# Patient Record
Sex: MALE | Race: WHITE | NOT HISPANIC OR LATINO | Employment: FULL TIME | URBAN - METROPOLITAN AREA
[De-identification: names, ages, dates, MRNs, and addresses within clinical notes are randomized per-mention and may not be internally consistent; named-entity substitution may affect disease eponyms.]

---

## 2024-11-13 ENCOUNTER — OFFICE VISIT (OUTPATIENT)
Dept: OBGYN CLINIC | Facility: CLINIC | Age: 49
End: 2024-11-13
Payer: COMMERCIAL

## 2024-11-13 VITALS
HEIGHT: 70 IN | BODY MASS INDEX: 30.06 KG/M2 | SYSTOLIC BLOOD PRESSURE: 130 MMHG | WEIGHT: 210 LBS | DIASTOLIC BLOOD PRESSURE: 70 MMHG

## 2024-11-13 DIAGNOSIS — M65.332 TRIGGER FINGER, LEFT MIDDLE FINGER: Primary | ICD-10-CM

## 2024-11-13 DIAGNOSIS — M65.322 TRIGGER FINGER, LEFT INDEX FINGER: ICD-10-CM

## 2024-11-13 PROCEDURE — 99203 OFFICE O/P NEW LOW 30 MIN: CPT | Performed by: SURGERY

## 2024-11-13 RX ORDER — DIPHENOXYLATE HYDROCHLORIDE AND ATROPINE SULFATE 2.5; .025 MG/1; MG/1
1 TABLET ORAL DAILY
COMMUNITY

## 2024-11-13 RX ORDER — METOPROLOL SUCCINATE 100 MG/1
100 TABLET, EXTENDED RELEASE ORAL DAILY
COMMUNITY
Start: 2024-11-04

## 2024-11-13 RX ORDER — METHIMAZOLE 5 MG/1
7.5 TABLET ORAL
COMMUNITY
Start: 2024-07-09 | End: 2025-07-09

## 2024-11-13 NOTE — PROGRESS NOTES
ORTHOPAEDIC HAND, WRIST, AND ELBOW OFFICE  VISIT       ASSESSMENT/PLAN:      49 y.o. year old male who presents with left Index and Middle finger trigger fingers    Physical exam was performed and we discussed the findings. Has some mild signs of trigger fingers  Can perform steroid injections today or monitor his symptoms. Patient will monitor. Can return for injections if symptoms worsen  NSAIDs as needed  Activities as tolerated. No restrictions from our standpoint    The patient verbalized understanding of exam findings and treatment plan. We engaged in the shared decision-making process and treatment options were discussed at length with the patient. Surgical and conservative management discussed today along with risks and benefits.    Diagnoses and all orders for this visit:    Trigger finger, left middle finger    Trigger finger, left index finger    Other orders  -     metoprolol succinate (TOPROL-XL) 100 mg 24 hr tablet; Take 100 mg by mouth daily  -     methimazole (TAPAZOLE) 5 mg tablet; Take 7.5 mg by mouth  -     multivitamin (THERAGRAN) TABS; Take 1 tablet by mouth daily        Follow Up:  Return if symptoms worsen or fail to improve.      ____________________________________________________________________________________________________________________________________________      CHIEF COMPLAINT:  Chief Complaint   Patient presents with    Left Hand - Pain, Locking, Numbness, Swelling       SUBJECTIVE:  Ruben Nicholson is a 49 y.o. year old  male who presents for evaluation of left hand pain    Patient states about 1 week ago he started to get decreased flexion of his Index and middle fingers with no known injury. His Middle finger also became swollen. He states there was some pain with flexion and then a click and is was sore. Pain is also noted on he dorsal 2nd and 3rd MP joints. He reports he has had pain and some swelling in the past with some days better than others         I have personally  reviewed all the relevant PMH, PSH, SH, FH, Medications and allergies      PAST MEDICAL HISTORY:  No past medical history on file.    PAST SURGICAL HISTORY:  No past surgical history on file.    FAMILY HISTORY:  No family history on file.    SOCIAL HISTORY:  Social History     Tobacco Use    Smoking status: Never    Smokeless tobacco: Never   Substance Use Topics    Alcohol use: Never    Drug use: Never       MEDICATIONS:    Current Outpatient Medications:     methimazole (TAPAZOLE) 5 mg tablet, Take 7.5 mg by mouth, Disp: , Rfl:     metoprolol succinate (TOPROL-XL) 100 mg 24 hr tablet, Take 100 mg by mouth daily, Disp: , Rfl:     multivitamin (THERAGRAN) TABS, Take 1 tablet by mouth daily, Disp: , Rfl:     ALLERGIES:  No Known Allergies        REVIEW OF SYSTEMS:  Review of Systems   Constitutional:  Negative for chills and fever.   HENT:  Negative for ear pain and sore throat.    Eyes:  Negative for pain and visual disturbance.   Respiratory:  Negative for cough and shortness of breath.    Cardiovascular:  Negative for chest pain and palpitations.   Gastrointestinal:  Negative for abdominal pain and vomiting.   Genitourinary:  Negative for dysuria and hematuria.   Musculoskeletal:  Negative for arthralgias and back pain.   Skin:  Negative for color change and rash.   Neurological:  Negative for seizures and syncope.   All other systems reviewed and are negative.      VITALS:  Vitals:    11/13/24 1446   BP: 130/70       LABS:  HgA1c:   Lab Results   Component Value Date    HGBA1C 5.8 (H) 09/23/2021     BMP:   Lab Results   Component Value Date    CALCIUM 9.7 09/06/2024    K 4.1 09/06/2024    CO2 27 09/06/2024     09/06/2024    BUN 23 09/06/2024    CREATININE 1.12 09/06/2024       _____________________________________________________  PHYSICAL EXAMINATION:  General: well developed and well nourished, alert, oriented times 3, and appears comfortable  Psychiatric: Normal  HEENT: Normocephalic, Atraumatic Trachea  Midline, No torticollis  Pulmonary: No audible wheezing or respiratory distress   Abdomen/GI: Non tender, non distended   Cardiovascular: No pitting edema, 2+ radial pulse   Skin: No masses, erythema, lacerations, fluctation, ulcerations  Neurovascular: Sensation Intact to the Median, Ulnar, Radial Nerve, Motor Intact to the Median, Ulnar, Radial Nerve, and Pulses Intact  Musculoskeletal: Normal, except as noted in detailed exam and in HPI.      MUSCULOSKELETAL EXAMINATION:  Right hand:  SILT  Composite fist    Left hand:  SILT  Composite fist slightly loose    Mild tenderness at A1 pulleys of Index, Middle fingers  Swelling note of digits    ___________________________________________________  STUDIES REVIEWED:  No new images obtained/reviewed      PROCEDURES PERFORMED:  Procedures  No Procedures performed today    _____________________________________________________      Scribe Attestation      I,:  Tye De Los Santos am acting as a scribe while in the presence of the attending physician.:       I,:  Jerry Buckner MD personally performed the services described in this documentation    as scribed in my presence.:

## 2025-03-07 ENCOUNTER — OFFICE VISIT (OUTPATIENT)
Dept: OBGYN CLINIC | Facility: CLINIC | Age: 50
End: 2025-03-07
Payer: COMMERCIAL

## 2025-03-07 DIAGNOSIS — M19.042 DEGENERATIVE ARTHRITIS OF METACARPOPHALANGEAL JOINT OF MIDDLE FINGER OF LEFT HAND: Primary | ICD-10-CM

## 2025-03-07 DIAGNOSIS — M79.642 LEFT HAND PAIN: ICD-10-CM

## 2025-03-07 DIAGNOSIS — M19.042 DEGENERATIVE ARTHRITIS OF METACARPOPHALANGEAL JOINT OF INDEX FINGER OF LEFT HAND: ICD-10-CM

## 2025-03-07 PROCEDURE — 20600 DRAIN/INJ JOINT/BURSA W/O US: CPT | Performed by: ORTHOPAEDIC SURGERY

## 2025-03-07 PROCEDURE — 99214 OFFICE O/P EST MOD 30 MIN: CPT | Performed by: ORTHOPAEDIC SURGERY

## 2025-03-07 RX ORDER — ROPIVACAINE HYDROCHLORIDE 2 MG/ML
0.5 INJECTION, SOLUTION EPIDURAL; INFILTRATION; PERINEURAL
Status: COMPLETED | OUTPATIENT
Start: 2025-03-07 | End: 2025-03-07

## 2025-03-07 RX ORDER — BETAMETHASONE SODIUM PHOSPHATE AND BETAMETHASONE ACETATE 3; 3 MG/ML; MG/ML
3 INJECTION, SUSPENSION INTRA-ARTICULAR; INTRALESIONAL; INTRAMUSCULAR; SOFT TISSUE
Status: COMPLETED | OUTPATIENT
Start: 2025-03-07 | End: 2025-03-07

## 2025-03-07 RX ADMIN — ROPIVACAINE HYDROCHLORIDE 0.5 ML: 2 INJECTION, SOLUTION EPIDURAL; INFILTRATION; PERINEURAL at 13:30

## 2025-03-07 RX ADMIN — BETAMETHASONE SODIUM PHOSPHATE AND BETAMETHASONE ACETATE 3 MG: 3; 3 INJECTION, SUSPENSION INTRA-ARTICULAR; INTRALESIONAL; INTRAMUSCULAR; SOFT TISSUE at 13:30

## 2025-03-07 NOTE — PROGRESS NOTES
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  This has been off-and-on with occasional flareups over the years.  Worsening recently.  He has failed appropriate management with oral medications and activity modification.  We discussed other treatment options including bracing, corticosteroid injections or surgery.  Patient would like to proceed with corticosteroid injections today.     Risks, benefits and alternative treatments were discussed with the patient. The risks of injection include but are not limited to: bleeding, infection, damage to nerves, vessels or tendons, allergic reaction to agents, possible increase in pain, tendon or ligament rupture, weakening of bone or soft tissues, and/or elevation in blood sugar. Patient understands and would like to proceed with the proposed procedure. Injection was tolerated well. Please see procedure note for details. May take NSAIDs/Tylenol or apply ice to the area as needed for post-injection discomfort.     Orders:    Small joint arthrocentesis: L long MCP    Degenerative arthritis of metacarpophalangeal joint of index finger of left hand  See above  Orders:    Small joint arthrocentesis: L index MCP    Left hand pain  See above  Orders:    XR hand 3+ vw left; Future    Small joint arthrocentesis: L index MCP    Small joint arthrocentesis: L long MCP        It is recommended he return to the office 3 months    he expressed understanding of the plan and agreed. We encouraged them to contact our office with any questions or concerns.     Small joint arthrocentesis: L index MCP  Universal Protocol:  Consent: Verbal consent obtained.  Risks and benefits: risks, benefits and alternatives were discussed  Consent given by: patient  Patient identity  confirmed: verbally with patient  Supporting Documentation  Indications: pain and joint swelling   Procedure Details  Location: index finger - L index MCP  Needle size: 25 G  Ultrasound guidance: no  Approach: dorsal  Medications administered: 3 mg betamethasone acetate-betamethasone sodium phosphate 6 (3-3) mg/mL; 0.5 mL ropivacaine 0.2 %        Small joint arthrocentesis: L long MCP  Universal Protocol:  Consent: Verbal consent obtained.  Risks and benefits: risks, benefits and alternatives were discussed  Consent given by: patient  Patient identity confirmed: verbally with patient  Supporting Documentation  Indications: pain   Procedure Details  Location: long finger - L long MCP  Needle size: 25 G  Approach: dorsal  Medications administered: 3 mg betamethasone acetate-betamethasone sodium phosphate 6 (3-3) mg/mL; 0.5 mL ropivacaine 0.2 %    Patient tolerance: patient tolerated the procedure well with no immediate complications            Referred By:  No referring provider defined for this encounter.    Chief Complaint:     Left hand pain      History of Present Illness:   50 y.o., Right hand dominant male presents with several years of waxing and waning left-sided hand pain.  Worsening recently.  Pain severely worsen over the last couple months.  Swelling is also increased.  Pain is primarily centered at the index and middle finger large knuckles which is where the swelling is also located.  Aggravated by activities.  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    Previously saw Dr. Buckner and was diagnosed with trigger fingers but received no treatment.  That note was reviewed.  Denies numbness or tingling.  He does state he gets some occasional clicking or catching in the fingers but the pain is his biggest problem.      Past Medical History:  No past medical history on file.  No past surgical history on file.  No family history on file.  Social History     Socioeconomic History    Marital  status: /Civil Union     Spouse name: Not on file    Number of children: Not on file    Years of education: Not on file    Highest education level: Not on file   Occupational History    Not on file   Tobacco Use    Smoking status: Never    Smokeless tobacco: Never   Substance and Sexual Activity    Alcohol use: Never    Drug use: Never    Sexual activity: Not on file   Other Topics Concern    Not on file   Social History Narrative    Not on file     Social Drivers of Health     Financial Resource Strain: Not on file   Food Insecurity: Not on file   Transportation Needs: Not on file   Physical Activity: Not on file   Stress: Not on file   Social Connections: Not on file   Intimate Partner Violence: Not on file   Housing Stability: Not on file     Scheduled Meds:  Continuous Infusions:No current facility-administered medications for this visit.    PRN Meds:.  No Known Allergies        Physical Examination:    There were no vitals taken for this visit.    Gen: A&Ox3, NAD  Cardiac: regular rate  Chest: non labored breathing  Abdomen: Non-distended    Cervcial Spine: Negative Spurling's    Right Upper Extremity:  Skin CDI  No obvious deformity of the shoulder, arm, elbow, forearm, wrist, hand  Nontender  Composite fist  Sensation intact to light touch in the axillary median, ulnar, and radial nerve distributions  5/5 motor  strength  Warm, well-perfused digits  Cap refill <2s          Left Upper Extremity:  Skin CDI  Swelling at the index and middle finger MP joints  TTP dorsal aspect of the index and middle finger MP joints greater than volarly   no palpable triggering or locking  Loose fist formation but about 20 degrees decreased flexion of the index and middle finger MP joints compared to the other digits  Sensation intact to light touch in the axillary median, ulnar, and radial nerve distributions  4+/5 motor  strength limited by pain  Warm, well-perfused digits  Cap refill  <2s          Studies:  Radiographs: I personally reviewed and independently interpreted the available radiographs.  3/7/2025: Radiographs of the left hand, multiple views, demonstrate degenerative changes at the middle greater than index MP joints.  Also moderate degenerative changes at the thumb MP joint.  No fractures or dislocations    Labs:  I personally reviewed the following labs,   Lab Results   Component Value Date    HGBA1C 5.8 (H) 09/23/2021                 Luis Alberto Lee MD  Hand and Upper Extremity Surgery        *This note was dictated using Dragon voice recognition software. Please excuse any word substitutions or errors.*

## 2025-04-04 ENCOUNTER — OFFICE VISIT (OUTPATIENT)
Dept: OBGYN CLINIC | Facility: CLINIC | Age: 50
End: 2025-04-04
Payer: COMMERCIAL

## 2025-04-04 DIAGNOSIS — M65.322 TRIGGER FINGER, LEFT INDEX FINGER: ICD-10-CM

## 2025-04-04 DIAGNOSIS — M19.042 DEGENERATIVE ARTHRITIS OF METACARPOPHALANGEAL JOINT OF INDEX FINGER OF LEFT HAND: ICD-10-CM

## 2025-04-04 DIAGNOSIS — M19.042 DEGENERATIVE ARTHRITIS OF METACARPOPHALANGEAL JOINT OF MIDDLE FINGER OF LEFT HAND: Primary | ICD-10-CM

## 2025-04-04 DIAGNOSIS — M65.332 TRIGGER FINGER, LEFT MIDDLE FINGER: ICD-10-CM

## 2025-04-04 PROCEDURE — 20550 NJX 1 TENDON SHEATH/LIGAMENT: CPT | Performed by: ORTHOPAEDIC SURGERY

## 2025-04-04 PROCEDURE — 99213 OFFICE O/P EST LOW 20 MIN: CPT | Performed by: ORTHOPAEDIC SURGERY

## 2025-04-04 RX ORDER — BETAMETHASONE SODIUM PHOSPHATE AND BETAMETHASONE ACETATE 3; 3 MG/ML; MG/ML
6 INJECTION, SUSPENSION INTRA-ARTICULAR; INTRALESIONAL; INTRAMUSCULAR; SOFT TISSUE
Status: COMPLETED | OUTPATIENT
Start: 2025-04-04 | End: 2025-04-04

## 2025-04-04 RX ORDER — LIDOCAINE HYDROCHLORIDE 10 MG/ML
1 INJECTION, SOLUTION INFILTRATION; PERINEURAL
Status: COMPLETED | OUTPATIENT
Start: 2025-04-04 | End: 2025-04-04

## 2025-04-04 RX ADMIN — LIDOCAINE HYDROCHLORIDE 1 ML: 10 INJECTION, SOLUTION INFILTRATION; PERINEURAL at 10:45

## 2025-04-04 RX ADMIN — BETAMETHASONE SODIUM PHOSPHATE AND BETAMETHASONE ACETATE 6 MG: 3; 3 INJECTION, SUSPENSION INTRA-ARTICULAR; INTRALESIONAL; INTRAMUSCULAR; SOFT TISSUE at 10:45

## 2025-04-04 NOTE — PROGRESS NOTES
HAND & UPPER EXTREMITY OFFICE VISIT   Referred By:  No referring provider defined for this encounter.        Chief Complaint:     Left hand pain    Previous History:   Patient was last seen 3/7/2025 and was provided CSI into MCPs of Left long and Index fingers. We also discussed his XR show some degenerative changes of these joints as well    Interval History:  Since last visit patient states the injection did provide partial relief for a few days. Wife states he did have some palmar pain a few days after the injections. Since then he has had locking of the distal joint of the middle finger . He states he does have worse pain at times. He has pain when he bumps the joints and he cannot graps due to the pain. His knuckles are swollen still and there is a burning sensation of the skin at times on the dorsal hand as well  No numbness noted    Past Medical History:  History reviewed. No pertinent past medical history.  History reviewed. No pertinent surgical history.  History reviewed. No pertinent family history.  Social History     Socioeconomic History    Marital status: /Civil Union     Spouse name: Not on file    Number of children: Not on file    Years of education: Not on file    Highest education level: Not on file   Occupational History    Not on file   Tobacco Use    Smoking status: Never    Smokeless tobacco: Never   Substance and Sexual Activity    Alcohol use: Never    Drug use: Never    Sexual activity: Not on file   Other Topics Concern    Not on file   Social History Narrative    Not on file     Social Drivers of Health     Financial Resource Strain: Not on file   Food Insecurity: Not on file   Transportation Needs: Not on file   Physical Activity: Not on file   Stress: Not on file   Social Connections: Not on file   Intimate Partner Violence: Not on file   Housing Stability: Not on file     Scheduled Meds:  Continuous Infusions:No current facility-administered medications for this  visit.    PRN Meds:.  No Known Allergies    Physical Examination:    There were no vitals taken for this visit.    Gen: A&Ox3, NAD  Cardiac: regular rate  Chest: non labored breathing  Abdomen: Non-distended      Left Upper Extremity:  Skin CDI  Swelling at the index and middle finger MP joints  TTP dorsal aspect of the index and middle finger MP joints greater than volarly   no palpable triggering or locking  Loose fist formation but about 20 degrees decreased flexion of the index and middle finger MP joints compared to the other digits  Sensation intact to light touch in the axillary median, ulnar, and radial nerve distributions  4+/5 motor  strength limited by pain  Warm, well-perfused digits  Cap refill <2s       Studies:  No new images obtained/reviewed      3/7/2025: Radiographs of the left hand, multiple views, demonstrate degenerative changes at the middle greater than index MP joints.  Also moderate degenerative changes at the thumb MP joint.  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  However his radiographic changes are mild to moderate.  We discussed potentially proceeding with a repeat corticosteroid injection although I cannot guarantee longer relief.  He does have some continued volar pain after the injections which never really got better and is possible that his tendons are also contributing in the form of a mild trigger finger.  After discussion of risk benefits alternatives he elected proceed with a trigger finger injection to the left middle and index fingers.    We did also discuss surgical interventions if he fails to gain significant relief.  Options include MP arthroplasty versus arthrodesis.  I do not think arthroplasty is a great option for him  given his age and activity level.  I think he would be at high risk of loosening and needing a revision surgery.  Arthrodesis would be more durable but I would definitely limit his range of motion and fusing to MP joints could potentially be detrimental to his physical activities.  He is not interested in either of the surgical options.    We did discuss that we could potentially perform a MP joint arthroscopy with synovectomy and debridement but I would want to obtain an MRI first to evaluate the amount of cartilage damage before considering this was an option.  He is in agreement with this and MRI was ordered.     Orders:    MRI hand left wo contrast; Future    Degenerative arthritis of metacarpophalangeal joint of index finger of left hand  Same as above  Orders:    MRI hand left wo contrast; Future    Trigger finger, left middle finger  Risks, benefits and alternative treatments were discussed with the patient. The risks of injection include but are not limited to: bleeding, infection, damage to nerves, vessels or tendons, allergic reaction to agents, possible increase in pain, tendon or ligament rupture, weakening of bone or soft tissues, and/or elevation in blood sugar. Patient understands and would like to proceed with the proposed procedures. Injections were tolerated well. Please see procedure note for details. May take NSAIDs/Tylenol or apply ice to the area as needed for post-injection discomfort.     Orders:    Hand/upper extremity injection: L long A1    Trigger finger, left index finger  Risks, benefits and alternative treatments were discussed with the patient. The risks of injection include but are not limited to: bleeding, infection, damage to nerves, vessels or tendons, allergic reaction to agents, possible increase in pain, tendon or ligament rupture, weakening of bone or soft tissues, and/or elevation in blood sugar. Patient understands and would like to proceed with the proposed procedures.  Injections were tolerated well. Please see procedure note for details. May take NSAIDs/Tylenol or apply ice to the area as needed for post-injection discomfort.     Orders:    Hand/upper extremity injection: L index A1        I recommend they follow up Return for after MRI.  he expressed understanding of the plan and agreed. We encouraged them to contact our office with any questions or concerns.     Hand/upper extremity injection: L index A1  Universal Protocol:  procedure performed by consultantConsent: Verbal consent obtained.  Risks and benefits: risks, benefits and alternatives were discussed  Consent given by: patient  Timeout called at: 4/4/2025 11:06 AM.  Patient understanding: patient states understanding of the procedure being performed  Site marked: the operative site was marked  Patient identity confirmed: verbally with patient  Supporting Documentation  Indications: pain and tendon swelling   Procedure Details  Condition:trigger finger Location: index finger - L index A1   Needle size: 25 G  Ultrasound guidance: no  Approach: volar  Medications administered: 1 mL lidocaine 1 %; 6 mg betamethasone acetate-betamethasone sodium phosphate 6 (3-3) mg/mL  Patient tolerance: patient tolerated the procedure well with no immediate complications  Dressing:  Sterile dressing applied       Hand/upper extremity injection: L long A1  Universal Protocol:  procedure performed by consultantConsent: Verbal consent obtained.  Risks and benefits: risks, benefits and alternatives were discussed  Consent given by: patient  Timeout called at: 4/4/2025 11:06 AM.  Patient understanding: patient states understanding of the procedure being performed  Site marked: the operative site was marked  Patient identity confirmed: verbally with patient  Supporting Documentation  Indications: pain and tendon swelling   Procedure Details  Condition:trigger finger Location: long finger - L long A1   Needle size: 25 G  Ultrasound guidance:  no  Approach: volar  Medications administered: 1 mL lidocaine 1 %; 6 mg betamethasone acetate-betamethasone sodium phosphate 6 (3-3) mg/mL  Patient tolerance: patient tolerated the procedure well with no immediate complications  Dressing:  Sterile dressing applied             Luis Alberto Lee MD  Hand and Upper Extremity Surgery      *This note was dictated using Dragon voice recognition software. Please excuse any word substitutions or errors.*

## 2025-04-12 ENCOUNTER — HOSPITAL ENCOUNTER (OUTPATIENT)
Dept: RADIOLOGY | Facility: HOSPITAL | Age: 50
Discharge: HOME | End: 2025-04-12
Payer: COMMERCIAL

## 2025-04-12 ENCOUNTER — HOSPITAL ENCOUNTER (OUTPATIENT)
Dept: MRI IMAGING | Facility: HOSPITAL | Age: 50
Discharge: HOME | End: 2025-04-12
Attending: ORTHOPAEDIC SURGERY
Payer: COMMERCIAL

## 2025-04-12 DIAGNOSIS — M19.042 DEGENERATIVE ARTHRITIS OF METACARPOPHALANGEAL JOINT OF MIDDLE FINGER OF LEFT HAND: ICD-10-CM

## 2025-04-12 DIAGNOSIS — M19.042 DEGENERATIVE ARTHRITIS OF METACARPOPHALANGEAL JOINT OF INDEX FINGER OF LEFT HAND: ICD-10-CM

## 2025-04-12 PROCEDURE — 73218 MRI UPPER EXTREMITY W/O DYE: CPT

## 2025-04-18 ENCOUNTER — APPOINTMENT (OUTPATIENT)
Dept: LAB | Facility: IMAGING CENTER | Age: 50
End: 2025-04-18
Payer: COMMERCIAL

## 2025-04-18 ENCOUNTER — OFFICE VISIT (OUTPATIENT)
Dept: OBGYN CLINIC | Facility: CLINIC | Age: 50
End: 2025-04-18
Payer: COMMERCIAL

## 2025-04-18 VITALS — HEIGHT: 70 IN | BODY MASS INDEX: 30.06 KG/M2 | WEIGHT: 210 LBS

## 2025-04-18 DIAGNOSIS — M65.332 TRIGGER FINGER, LEFT MIDDLE FINGER: ICD-10-CM

## 2025-04-18 DIAGNOSIS — M19.042 DEGENERATIVE ARTHRITIS OF METACARPOPHALANGEAL JOINT OF MIDDLE FINGER OF LEFT HAND: Primary | ICD-10-CM

## 2025-04-18 DIAGNOSIS — M19.042 DEGENERATIVE ARTHRITIS OF METACARPOPHALANGEAL JOINT OF INDEX FINGER OF LEFT HAND: ICD-10-CM

## 2025-04-18 DIAGNOSIS — M19.042 DEGENERATIVE ARTHRITIS OF METACARPOPHALANGEAL JOINT OF MIDDLE FINGER OF LEFT HAND: ICD-10-CM

## 2025-04-18 DIAGNOSIS — M65.322 TRIGGER FINGER, LEFT INDEX FINGER: ICD-10-CM

## 2025-04-18 LAB
BASOPHILS # BLD AUTO: 0.03 THOUSANDS/ÂΜL (ref 0–0.1)
BASOPHILS NFR BLD AUTO: 0 % (ref 0–1)
CRP SERPL QL: 2 MG/L
EOSINOPHIL # BLD AUTO: 0.16 THOUSAND/ÂΜL (ref 0–0.61)
EOSINOPHIL NFR BLD AUTO: 1 % (ref 0–6)
ERYTHROCYTE [DISTWIDTH] IN BLOOD BY AUTOMATED COUNT: 12.6 % (ref 11.6–15.1)
ERYTHROCYTE [SEDIMENTATION RATE] IN BLOOD: 17 MM/HOUR (ref 0–19)
HCT VFR BLD AUTO: 55.3 % (ref 36.5–49.3)
HGB BLD-MCNC: 17.8 G/DL (ref 12–17)
IMM GRANULOCYTES # BLD AUTO: 0.06 THOUSAND/UL (ref 0–0.2)
IMM GRANULOCYTES NFR BLD AUTO: 1 % (ref 0–2)
LYMPHOCYTES # BLD AUTO: 2.29 THOUSANDS/ÂΜL (ref 0.6–4.47)
LYMPHOCYTES NFR BLD AUTO: 20 % (ref 14–44)
MCH RBC QN AUTO: 28.1 PG (ref 26.8–34.3)
MCHC RBC AUTO-ENTMCNC: 31.8 G/DL (ref 31.4–37.4)
MCV RBC AUTO: 88 FL (ref 82–98)
MONOCYTES # BLD AUTO: 0.97 THOUSAND/ÂΜL (ref 0.17–1.22)
MONOCYTES NFR BLD AUTO: 8 % (ref 4–12)
NEUTROPHILS # BLD AUTO: 8.15 THOUSANDS/ÂΜL (ref 1.85–7.62)
NEUTS SEG NFR BLD AUTO: 70 % (ref 43–75)
NRBC BLD AUTO-RTO: 0 /100 WBCS
PLATELET # BLD AUTO: 300 THOUSANDS/UL (ref 149–390)
PMV BLD AUTO: 10 FL (ref 8.9–12.7)
RBC # BLD AUTO: 6.34 MILLION/UL (ref 3.88–5.62)
RHEUMATOID FACT SERPL-ACNC: <10 IU/ML
WBC # BLD AUTO: 11.66 THOUSAND/UL (ref 4.31–10.16)

## 2025-04-18 PROCEDURE — 99214 OFFICE O/P EST MOD 30 MIN: CPT | Performed by: ORTHOPAEDIC SURGERY

## 2025-04-18 PROCEDURE — 36415 COLL VENOUS BLD VENIPUNCTURE: CPT

## 2025-04-18 PROCEDURE — 86140 C-REACTIVE PROTEIN: CPT

## 2025-04-18 PROCEDURE — 86431 RHEUMATOID FACTOR QUANT: CPT

## 2025-04-18 PROCEDURE — 85652 RBC SED RATE AUTOMATED: CPT

## 2025-04-18 PROCEDURE — 86225 DNA ANTIBODY NATIVE: CPT

## 2025-04-18 PROCEDURE — 86038 ANTINUCLEAR ANTIBODIES: CPT

## 2025-04-18 PROCEDURE — 86200 CCP ANTIBODY: CPT

## 2025-04-18 PROCEDURE — 85025 COMPLETE CBC W/AUTO DIFF WBC: CPT

## 2025-04-18 RX ORDER — ACETAMINOPHEN 325 MG/1
975 TABLET ORAL ONCE
OUTPATIENT
Start: 2025-04-18 | End: 2025-04-18

## 2025-04-18 NOTE — PROGRESS NOTES
"    HAND & UPPER EXTREMITY OFFICE VISIT   Referred By:  No referring provider defined for this encounter.        Chief Complaint:     Left hand pain    Previous History:   Patient was last seen 4/4/2025 and was provided CSI into Left long and Index fingers trigger fingers. . We also discussed obtaining an MRI of his  Left hand     Interval History:  Since last visit patient states the trigger injections seems to have helpd as he has decreased pain in his hand.  But he has also not been using his hands for the last 2 weeks as well so this had helped.     He did read over the MRI and is here to discuss it as well as treatment options    Past Medical History:  History reviewed. No pertinent past medical history.  History reviewed. No pertinent surgical history.  History reviewed. No pertinent family history.  Social History     Socioeconomic History    Marital status: /Civil Union     Spouse name: Not on file    Number of children: Not on file    Years of education: Not on file    Highest education level: Not on file   Occupational History    Not on file   Tobacco Use    Smoking status: Never    Smokeless tobacco: Never   Substance and Sexual Activity    Alcohol use: Never    Drug use: Never    Sexual activity: Not on file   Other Topics Concern    Not on file   Social History Narrative    Not on file     Social Drivers of Health     Financial Resource Strain: Not on file   Food Insecurity: Not on file   Transportation Needs: Not on file   Physical Activity: Not on file   Stress: Not on file   Social Connections: Not on file   Intimate Partner Violence: Not on file   Housing Stability: Not on file     Scheduled Meds:  Continuous Infusions:No current facility-administered medications for this visit.    PRN Meds:.  No Known Allergies    Physical Examination:    Ht 5' 10\" (1.778 m)   Wt 95.3 kg (210 lb)   BMI 30.13 kg/m²     Gen: A&Ox3, NAD  Cardiac: regular rate  Chest: non labored breathing  Abdomen: " Non-distended      Left Upper Extremity:  Skin CDI  Swelling at the index and middle finger MP joints  TTP dorsal aspect of the index and middle finger MP joints greater than volarly   no palpable triggering or locking  Loose fist formation but about 20 degrees decreased flexion of the index and middle finger MP joints compared to the other digits  Sensation intact to light touch in the axillary median, ulnar, and radial nerve distributions  4+/5 motor  strength limited by pain  Warm, well-perfused digits  Cap refill <2s       Studies:  MRI 4/12/2025: Was personally reviewed interpreted.  Demonstrates inflammation increased thickened synovium at the third MP joint consistent with possible inflammatory arthritis.  Minimal degenerative changes at the second MP joint.    3/7/2025: Radiographs of the left hand, multiple views, demonstrate degenerative changes at the middle greater than index MP joints.  Also moderate degenerative changes at the thumb MP joint.  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 We discussed options are the same as discussed last visit  I recommend we get blood work to R/O inflammatory arthritis. It is reasonable to do the surgery we discussed last visit of arthroscopic vs open synovectomy and debridement.  Given his relief with the trigger finger injections I think it also be reasonable to proceed with trigger finger releases.     We reviewed the risks of surgery including infection, bleeding, injury to nearby structures including the nerve, poor wound healing,  stiffness, CRPS, and incomplete resolution or recurrence of symptoms. We reviewed the details of the procedure and the anticipated recovery period. All questions answered to patient's satisfaction. Written consent obtained in the office today.      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  Same as above  Orders:    Sedimentation rate, automated; Future    C-reactive protein; Future    Cyclic citrul peptide antibody, IgG; Future    CBC and differential; Future    Rheumatoid Factor Panel; Future    VINCE Screen w/Reflex Cascade; Future    Case request operating room: RELEASE TRIGGER FINGER - Left index and middle finger,   Left index and middle finger metacarpophalangeal joint arthroscopic, possible open, debridement and synovectomy; Standing    Trigger finger, left middle finger  No treatment offered  Orders:    Case request operating room: RELEASE TRIGGER FINGER - Left index and middle finger,   Left index and middle finger metacarpophalangeal joint arthroscopic, possible open, debridement and synovectomy; Standing    Trigger finger, left index finger  No treatment offered  Orders:    Case request operating room: RELEASE TRIGGER FINGER - Left index and middle finger,   Left index and middle finger metacarpophalangeal joint arthroscopic, possible open, debridement and synovectomy; Standing        I recommend they follow up Return for Postop.  he expressed understanding of the plan and agreed. We encouraged them to contact our office with any questions or concerns.           Luis Alberto Lee MD  Hand and Upper Extremity Surgery      *This note was dictated using Dragon voice recognition software. Please excuse any word substitutions or errors.*

## 2025-04-21 LAB
CCP AB SER IA-ACNC: 1.4 (ref ?–10)
DSDNA IGG SERPL IA-ACNC: 2.9 IU/ML (ref ?–15)
NUCLEAR IGG SER IA-RTO: 0.1 RATIO (ref ?–1)

## 2025-05-05 ENCOUNTER — ANESTHESIA EVENT (OUTPATIENT)
Age: 50
End: 2025-05-05

## 2025-05-05 ENCOUNTER — ANESTHESIA (OUTPATIENT)
Age: 50
End: 2025-05-05

## 2025-05-06 DIAGNOSIS — M19.042 DEGENERATIVE ARTHRITIS OF METACARPOPHALANGEAL JOINT OF MIDDLE FINGER OF LEFT HAND: Primary | ICD-10-CM

## 2025-05-06 DIAGNOSIS — M19.042 DEGENERATIVE ARTHRITIS OF METACARPOPHALANGEAL JOINT OF INDEX FINGER OF LEFT HAND: ICD-10-CM

## 2025-05-12 ENCOUNTER — APPOINTMENT (OUTPATIENT)
Dept: LAB | Facility: CLINIC | Age: 50
End: 2025-05-12
Payer: COMMERCIAL

## 2025-05-12 ENCOUNTER — TELEPHONE (OUTPATIENT)
Age: 50
End: 2025-05-12

## 2025-05-12 DIAGNOSIS — M19.042 DEGENERATIVE ARTHRITIS OF METACARPOPHALANGEAL JOINT OF INDEX FINGER OF LEFT HAND: ICD-10-CM

## 2025-05-12 DIAGNOSIS — M19.042 DEGENERATIVE ARTHRITIS OF METACARPOPHALANGEAL JOINT OF MIDDLE FINGER OF LEFT HAND: ICD-10-CM

## 2025-05-13 ENCOUNTER — TELEPHONE (OUTPATIENT)
Dept: OBGYN CLINIC | Facility: CLINIC | Age: 50
End: 2025-05-13

## 2025-05-13 RX ORDER — RIVAROXABAN 20 MG/1
1 TABLET, FILM COATED ORAL
COMMUNITY
Start: 2025-02-28

## 2025-05-13 NOTE — TELEPHONE ENCOUNTER
Caller: Riana (wife)    Doctor: Dr. Lee / [facility]     Reason for call: Riana forgot to  her 's forms yesterday.  Patient's wife called to request completed [facility] forms to be   Faxed to   Attn: Riana   Fax # [phone]    Call back#:[phone]

## 2025-05-13 NOTE — PRE-PROCEDURE INSTRUCTIONS
Pre-Surgery Instructions:   Medication Instructions    methimazole (TAPAZOLE) 5 mg tablet Take day of surgery.    metoprolol succinate (TOPROL-XL) 100 mg 24 hr tablet Take day of surgery.    multivitamin (THERAGRAN) TABS Stop taking 5 days prior to surgery.    Xarelto 20 MG tablet Instructions provided by MD    Medication instructions for day of surgery reviewed. Please take all instructed medications with only a sip of water.       You will receive a call one business day prior to surgery with an arrival time and hospital directions. If your surgery is scheduled on a Monday, the hospital will be calling you on the Friday prior to your surgery. If you have not heard from anyone by 8pm, please call the hospital supervisor through the hospital  at [phone]. ([place] [phone] or [place] [phone]).    Do not eat or drink anything after midnight the night before your surgery, including candy, mints, lifesavers, or chewing gum. Do not drink alcohol 24hrs before your surgery. Try not to smoke at least 24hrs before your surgery.       Follow the pre surgery showering instructions as listed in the “My Surgical Experience Booklet” or otherwise provided by your surgeon's office. Do not use a blade to shave the surgical area 1 week before surgery. It is okay to use a clean electric clippers up to 24 hours before surgery. Do not apply any lotions, creams, including makeup, cologne, deodorant, or perfumes after showering on the day of your surgery. Do not use dry shampoo, hair spray, hair gel, or any type of hair products.     No contact lenses, eye make-up, or artificial eyelashes. Remove nail polish, including gel polish, and any artificial, gel, or acrylic nails if possible. Remove all jewelry including rings and body piercing jewelry.     Wear causal clothing that is easy to take on and off. Consider your type of surgery.    Keep any valuables, jewelry, piercings at home. Please bring any specially  ordered equipment (sling, braces) if indicated.    Arrange for a responsible person to drive you to and from the hospital on the day of your surgery. Please confirm the visitor policy for the day of your procedure when you receive your phone call with an arrival time.     Call the surgeon's office with any new illnesses, exposures, or additional questions prior to surgery.    Please reference your “My Surgical Experience Booklet” for additional information to prepare for your upcoming surgery.

## 2025-05-16 PROBLEM — M65.322 TRIGGER FINGER, LEFT INDEX FINGER: Status: ACTIVE | Noted: 2025-05-16

## 2025-05-16 PROBLEM — M19.042 DEGENERATIVE ARTHRITIS OF METACARPOPHALANGEAL JOINT OF INDEX FINGER OF LEFT HAND: Status: ACTIVE | Noted: 2025-05-16

## 2025-05-16 PROBLEM — M19.042 DEGENERATIVE ARTHRITIS OF METACARPOPHALANGEAL JOINT OF MIDDLE FINGER OF LEFT HAND: Status: ACTIVE | Noted: 2025-05-16

## 2025-05-16 PROBLEM — M65.332 TRIGGER FINGER, LEFT MIDDLE FINGER: Status: ACTIVE | Noted: 2025-05-16

## 2025-05-16 NOTE — DISCHARGE INSTR - AVS FIRST PAGE
POST-OPERATIVE INSTRUCTIONS  TRIGGER FINGER RELEASES, FINGER JOINT ARTHROSCOPIES AND DEBRIDEMENT    You have just undergone trigger finger releases with finger joint arthroscopies and debridement by Dr. Lee in the operating room. It is our wish that your postoperative recovery be as quick and comfortable as possible.  Please carefully review the following items that are important in your recovery.    Pain Control:  After any operation, a certain degree of pain is to be expected.  You have been given a prescription for narcotic pain medicine, as well as acetaminophen and/or ibuprofen which will relieve most of your pain but may not relieve all of the pain.  Pain medicine may make you drowsy so please curtail your activities appropriately.  Do not drive while taking pain medicine.      When you go home, please keep your operated arm elevated at all times (above the level of your heart.)  If you do this, your swelling will be diminished and your pain will be diminished as well.    You had a nerve block as part of your surgical anesthesia as well as to aid in your post-operative pain control. This nerve block may last 12-36 hrs after surgery. While your block is working, you will not be able to feel or move your operative arm and hand. Please wear your sling while the block is in place, except for changing clothes or hygiene purposes, to protect your arm. As the block wears off you will start to notice pain in your operative extremity. Please take pain medication as soon as you start to notice the block wearing off. This prevents your pain from becoming unmanageable when the block has completely worn off.      Dressing Care:  The splint that you have on your extremity should remain on and intact until your first postoperative visit.  After surgery, make sure that your dressing is kept dry.  You can take a shower if you cover your arm with a plastic bag, such as a newspaper bag, and use tape or rubber bands. If your  dressing gets wet, take it off,  place Band-Aids on the wound and re-wrap it with sterile dressing that you can get at a local drug store, then please call the office to have a new splint placed as soon as possible.     Weight Bearing:  You should NOT bear weight through your operative extremity. Do not push off using your operative extremity.     If your fingers are not included in the splint, it is ok to move your free fingers as tolerated to prevent stiffness. You may also use your free fingers to assist with light activities of daily living such as putting on clothes, brushing your teeth and eating.     Please work on these finger range of motions exercises between now and you follow up visit:         Follow up:  If you do not already have one, please call our office for a follow-up appointment. It is best to call the day after surgery to make an appointment in 10-14 days.  At your first postoperative visit, you will be seen by either Dr. Lee, his PA;  or one of their associates and the sutures will be removed     You may also need an appointment to see a hand therapist to optimize your functional result. If this appointment has not already been made for you, this will be determined at your first post operative visit.     When to Call:  It is normal to see minor staining on the hand surgery dressing after surgery. If there is significant bleeding, you are advised to call the office during regular office hours to have this checked.     If you feel that you have a surgical emergency postoperatively that requires immediate attention, please call [phone]. In addition, any emergency can also be handled by the emergency room.      If you have questions regarding your surgery postop that you feel requires attention, please call the office.   If this occurs after our regular office hours, there is a message with instructions to talk to the physician on call.

## 2025-05-17 ENCOUNTER — ANESTHESIA EVENT (OUTPATIENT)
Age: 50
End: 2025-05-17
Payer: COMMERCIAL

## 2025-05-19 ENCOUNTER — ANESTHESIA (OUTPATIENT)
Age: 50
End: 2025-05-19
Payer: COMMERCIAL

## 2025-05-19 ENCOUNTER — HOSPITAL ENCOUNTER (OUTPATIENT)
Age: 50
Setting detail: OUTPATIENT SURGERY
Discharge: HOME | End: 2025-05-19
Attending: ORTHOPAEDIC SURGERY | Admitting: ORTHOPAEDIC SURGERY
Payer: COMMERCIAL

## 2025-05-19 VITALS
SYSTOLIC BLOOD PRESSURE: 130 MMHG | TEMPERATURE: 97.6 F | RESPIRATION RATE: 18 BRPM | BODY MASS INDEX: 30.64 KG/M2 | DIASTOLIC BLOOD PRESSURE: 80 MMHG | OXYGEN SATURATION: 98 % | HEIGHT: 70 IN | HEART RATE: 64 BPM | WEIGHT: 214 LBS

## 2025-05-19 DIAGNOSIS — M65.322 TRIGGER FINGER, LEFT INDEX FINGER: Primary | ICD-10-CM

## 2025-05-19 DIAGNOSIS — M19.042 DEGENERATIVE ARTHRITIS OF METACARPOPHALANGEAL JOINT OF MIDDLE FINGER OF LEFT HAND: ICD-10-CM

## 2025-05-19 DIAGNOSIS — M65.332 TRIGGER FINGER, LEFT MIDDLE FINGER: ICD-10-CM

## 2025-05-19 DIAGNOSIS — M19.042 DEGENERATIVE ARTHRITIS OF METACARPOPHALANGEAL JOINT OF INDEX FINGER OF LEFT HAND: ICD-10-CM

## 2025-05-19 DIAGNOSIS — Z47.89 AFTERCARE FOLLOWING SURGERY OF THE MUSCULOSKELETAL SYSTEM: ICD-10-CM

## 2025-05-19 PROBLEM — I42.0 CARDIOMYOPATHY, DILATED (HCC): Status: ACTIVE | Noted: 2021-10-13

## 2025-05-19 PROBLEM — K85.90 PANCREATITIS: Status: ACTIVE | Noted: 2021-09-22

## 2025-05-19 PROBLEM — E83.42 HYPOMAGNESEMIA: Status: ACTIVE | Noted: 2021-09-23

## 2025-05-19 PROBLEM — E05.00 GRAVES DISEASE: Status: ACTIVE | Noted: 2022-10-25

## 2025-05-19 PROBLEM — I48.0 PAROXYSMAL ATRIAL FIBRILLATION (HCC): Status: ACTIVE | Noted: 2021-09-22

## 2025-05-19 PROBLEM — N17.9 ACUTE NONTRAUMATIC KIDNEY INJURY (HCC): Status: ACTIVE | Noted: 2022-01-19

## 2025-05-19 PROBLEM — E05.90 HYPERTHYROIDISM: Status: ACTIVE | Noted: 2021-09-22

## 2025-05-19 PROBLEM — N28.0 RENAL INFARCT (HCC): Status: ACTIVE | Noted: 2021-09-22

## 2025-05-19 PROBLEM — R50.9 FEBRILE ILLNESS: Status: ACTIVE | Noted: 2021-09-23

## 2025-05-19 PROCEDURE — 26055 INCISE FINGER TENDON SHEATH: CPT | Performed by: ORTHOPAEDIC SURGERY

## 2025-05-19 PROCEDURE — NC001 PR NO CHARGE: Performed by: ORTHOPAEDIC SURGERY

## 2025-05-19 PROCEDURE — 29901 MCP JOINT ARTHROSCOPY SURG: CPT | Performed by: ORTHOPAEDIC SURGERY

## 2025-05-19 RX ORDER — ACETAMINOPHEN 500 MG
1000 TABLET ORAL EVERY 8 HOURS
Qty: 60 TABLET | Refills: 0 | Status: SHIPPED | OUTPATIENT
Start: 2025-05-19

## 2025-05-19 RX ORDER — ONDANSETRON 2 MG/ML
INJECTION INTRAMUSCULAR; INTRAVENOUS AS NEEDED
Status: DISCONTINUED | OUTPATIENT
Start: 2025-05-19 | End: 2025-05-19

## 2025-05-19 RX ORDER — MIDAZOLAM HYDROCHLORIDE 2 MG/2ML
INJECTION, SOLUTION INTRAMUSCULAR; INTRAVENOUS AS NEEDED
Status: DISCONTINUED | OUTPATIENT
Start: 2025-05-19 | End: 2025-05-19

## 2025-05-19 RX ORDER — FENTANYL CITRATE 50 UG/ML
INJECTION, SOLUTION INTRAMUSCULAR; INTRAVENOUS AS NEEDED
Status: DISCONTINUED | OUTPATIENT
Start: 2025-05-19 | End: 2025-05-19

## 2025-05-19 RX ORDER — ONDANSETRON 4 MG/1
4 TABLET, FILM COATED ORAL EVERY 8 HOURS PRN
Qty: 10 TABLET | Refills: 0 | Status: SHIPPED | OUTPATIENT
Start: 2025-05-19

## 2025-05-19 RX ORDER — CEFAZOLIN SODIUM 2 G/50ML
2000 SOLUTION INTRAVENOUS ONCE
Status: COMPLETED | OUTPATIENT
Start: 2025-05-19 | End: 2025-05-19

## 2025-05-19 RX ORDER — MAGNESIUM HYDROXIDE 1200 MG/15ML
LIQUID ORAL AS NEEDED
Status: DISCONTINUED | OUTPATIENT
Start: 2025-05-19 | End: 2025-05-19 | Stop reason: HOSPADM

## 2025-05-19 RX ORDER — HYDROMORPHONE HCL/PF 1 MG/ML
0.5 SYRINGE (ML) INJECTION
Status: DISCONTINUED | OUTPATIENT
Start: 2025-05-19 | End: 2025-05-19 | Stop reason: HOSPADM

## 2025-05-19 RX ORDER — BUPIVACAINE HYDROCHLORIDE 5 MG/ML
INJECTION, SOLUTION EPIDURAL; INTRACAUDAL; PERINEURAL
Status: COMPLETED | OUTPATIENT
Start: 2025-05-19 | End: 2025-05-19

## 2025-05-19 RX ORDER — ACETAMINOPHEN 325 MG/1
975 TABLET ORAL ONCE
Status: COMPLETED | OUTPATIENT
Start: 2025-05-19 | End: 2025-05-19

## 2025-05-19 RX ORDER — PROPOFOL 10 MG/ML
INJECTION, EMULSION INTRAVENOUS AS NEEDED
Status: DISCONTINUED | OUTPATIENT
Start: 2025-05-19 | End: 2025-05-19

## 2025-05-19 RX ORDER — DEXAMETHASONE SODIUM PHOSPHATE 10 MG/ML
INJECTION, SOLUTION INTRAMUSCULAR; INTRAVENOUS AS NEEDED
Status: DISCONTINUED | OUTPATIENT
Start: 2025-05-19 | End: 2025-05-19

## 2025-05-19 RX ORDER — DOCUSATE SODIUM 100 MG/1
100 CAPSULE, LIQUID FILLED ORAL DAILY PRN
Qty: 10 CAPSULE | Refills: 0 | Status: SHIPPED | OUTPATIENT
Start: 2025-05-19

## 2025-05-19 RX ORDER — SODIUM CHLORIDE, SODIUM LACTATE, POTASSIUM CHLORIDE, CALCIUM CHLORIDE 600; 310; 30; 20 MG/100ML; MG/100ML; MG/100ML; MG/100ML
50 INJECTION, SOLUTION INTRAVENOUS CONTINUOUS
Status: DISCONTINUED | OUTPATIENT
Start: 2025-05-19 | End: 2025-05-19 | Stop reason: HOSPADM

## 2025-05-19 RX ORDER — ONDANSETRON 2 MG/ML
4 INJECTION INTRAMUSCULAR; INTRAVENOUS ONCE AS NEEDED
Status: DISCONTINUED | OUTPATIENT
Start: 2025-05-19 | End: 2025-05-19 | Stop reason: HOSPADM

## 2025-05-19 RX ORDER — IBUPROFEN 800 MG/1
800 TABLET, FILM COATED ORAL EVERY 8 HOURS SCHEDULED
Qty: 30 TABLET | Refills: 0 | Status: SHIPPED | OUTPATIENT
Start: 2025-05-19

## 2025-05-19 RX ORDER — ACETAMINOPHEN 325 MG/1
650 TABLET ORAL EVERY 6 HOURS PRN
Status: DISCONTINUED | OUTPATIENT
Start: 2025-05-19 | End: 2025-05-19 | Stop reason: HOSPADM

## 2025-05-19 RX ORDER — SODIUM CHLORIDE, SODIUM LACTATE, POTASSIUM CHLORIDE, CALCIUM CHLORIDE 600; 310; 30; 20 MG/100ML; MG/100ML; MG/100ML; MG/100ML
125 INJECTION, SOLUTION INTRAVENOUS CONTINUOUS
Status: DISCONTINUED | OUTPATIENT
Start: 2025-05-19 | End: 2025-05-19 | Stop reason: HOSPADM

## 2025-05-19 RX ORDER — FENTANYL CITRATE/PF 50 MCG/ML
50 SYRINGE (ML) INJECTION
Status: DISCONTINUED | OUTPATIENT
Start: 2025-05-19 | End: 2025-05-19 | Stop reason: HOSPADM

## 2025-05-19 RX ORDER — OXYCODONE HYDROCHLORIDE 5 MG/1
5 TABLET ORAL EVERY 6 HOURS PRN
Status: DISCONTINUED | OUTPATIENT
Start: 2025-05-19 | End: 2025-05-19 | Stop reason: HOSPADM

## 2025-05-19 RX ORDER — IBUPROFEN 600 MG/1
600 TABLET, FILM COATED ORAL EVERY 6 HOURS PRN
Status: DISCONTINUED | OUTPATIENT
Start: 2025-05-19 | End: 2025-05-19 | Stop reason: HOSPADM

## 2025-05-19 RX ORDER — OXYCODONE HYDROCHLORIDE 5 MG/1
5 TABLET ORAL EVERY 6 HOURS PRN
Qty: 8 TABLET | Refills: 0 | Status: SHIPPED | OUTPATIENT
Start: 2025-05-19

## 2025-05-19 RX ADMIN — PROPOFOL 250 MG: 10 INJECTION, EMULSION INTRAVENOUS at 08:22

## 2025-05-19 RX ADMIN — SODIUM CHLORIDE, SODIUM LACTATE, POTASSIUM CHLORIDE, AND CALCIUM CHLORIDE 125 ML/HR: .6; .31; .03; .02 INJECTION, SOLUTION INTRAVENOUS at 06:44

## 2025-05-19 RX ADMIN — FENTANYL CITRATE 100 MCG: 50 INJECTION INTRAMUSCULAR; INTRAVENOUS at 07:45

## 2025-05-19 RX ADMIN — SODIUM CHLORIDE, SODIUM LACTATE, POTASSIUM CHLORIDE, AND CALCIUM CHLORIDE: .6; .31; .03; .02 INJECTION, SOLUTION INTRAVENOUS at 09:34

## 2025-05-19 RX ADMIN — DEXAMETHASONE SODIUM PHOSPHATE 10 MG: 10 INJECTION INTRAMUSCULAR; INTRAVENOUS at 08:29

## 2025-05-19 RX ADMIN — MIDAZOLAM 2 MG: 1 INJECTION INTRAMUSCULAR; INTRAVENOUS at 07:45

## 2025-05-19 RX ADMIN — BUPIVACAINE 20 ML: 13.3 INJECTION, SUSPENSION, LIPOSOMAL INFILTRATION at 07:50

## 2025-05-19 RX ADMIN — PROPOFOL 100 MCG/KG/MIN: 10 INJECTION, EMULSION INTRAVENOUS at 08:26

## 2025-05-19 RX ADMIN — PHENYLEPHRINE HYDROCHLORIDE 30 MCG/MIN: 10 INJECTION INTRAVENOUS at 08:55

## 2025-05-19 RX ADMIN — CEFAZOLIN SODIUM 2000 MG: 2 SOLUTION INTRAVENOUS at 08:24

## 2025-05-19 RX ADMIN — BUPIVACAINE HYDROCHLORIDE 20 ML: 5 INJECTION, SOLUTION EPIDURAL; INTRACAUDAL; PERINEURAL at 07:50

## 2025-05-19 RX ADMIN — ONDANSETRON 4 MG: 2 INJECTION INTRAMUSCULAR; INTRAVENOUS at 08:29

## 2025-05-19 RX ADMIN — ACETAMINOPHEN 975 MG: 325 TABLET ORAL at 06:43

## 2025-05-19 NOTE — ANESTHESIA POSTPROCEDURE EVALUATION
Post-Op Assessment Note    CV Status:  Stable  Pain Score: 0    Pain management: adequate       Mental Status:  Awake and sleepy   Hydration Status:  Euvolemic   PONV Controlled:  Controlled   Airway Patency:  Patent  Two or more mitigation strategies used for obstructive sleep apnea   Post Op Vitals Reviewed: Yes    No anethesia notable event occurred.    Staff: Anesthesiologist, CRNA           Last Filed PACU Vitals:  Vitals Value Taken Time   Temp     Pulse 67 05/19/25 09:47   BP 97/55 05/19/25 09:46   Resp 15 05/19/25 09:47   SpO2 92 % 05/19/25 09:47   Vitals shown include unfiled device data.

## 2025-05-19 NOTE — ANESTHESIA PREPROCEDURE EVALUATION
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      Endocrine   (+) Graves disease      Rheumatology   (+) Trigger finger, left index finger   (+) Trigger finger, left middle finger      Cardiovascular/Peripheral Vascular   (+) Cardiomyopathy, dilated (HCC)      Other   (+) Febrile illness   (+) Hypomagnesemia      Lab Results   Component Value Date    SODIUM 140 09/06/2024    K 4.1 09/06/2024     09/06/2024    CO2 27 09/06/2024    AGAP 10 09/06/2024    BUN 23 09/06/2024    CREATININE 1.12 09/06/2024    GLUC 138 (H) 09/06/2024    CALCIUM 9.7 09/06/2024    AST 28 09/06/2024    ALT 28 09/06/2024    ALKPHOS 75 09/06/2024    TP 6.8 09/06/2024    TBILI 1.2 (H) 09/06/2024    EGFR 80 09/06/2024     Lab Results   Component Value Date    WBC 11.66 (H) 04/18/2025    HGB 17.8 (H) 04/18/2025    HCT 55.3 (H) 04/18/2025    MCV 88 04/18/2025     04/18/2025         Physical Exam    Airway     Mallampati score: II  TM Distance: >3 FB  Neck ROM: full      Cardiovascular      Dental       Pulmonary      Neurological      Other Findings        Anesthesia Plan  ASA Score- 2     Anesthesia Type- general with ASA Monitors.         Additional Monitors:     Airway Plan: LMA and LMA.           Plan Factors-Exercise tolerance (METS): >4 METS.    Chart reviewed. EKG reviewed. Imaging results reviewed. Existing labs reviewed. Patient summary reviewed.                   Induction-     Postoperative Plan- .   Monitoring Plan - Monitoring plan - standard ASA monitoring  Post Operative Pain Plan - peripheral nerve block        Informed Consent- Anesthetic plan and risks discussed with patient.  I personally reviewed this patient with the CRNA. Discussed and agreed on the Anesthesia Plan with the CRNA..      NPO Status:  Vitals Value Taken Time   Date of last liquid 05/18/25 05/19/25 06:40   Time of last liquid 1900 05/19/25 06:40   Date of last solid 05/18/25 05/19/25 06:40   Time of last solid 1900 05/19/25 06:40

## 2025-05-19 NOTE — INTERVAL H&P NOTE
H&P reviewed. After examining the patient I find no changes in the patients condition since the H&P had been written.    Vitals:    05/19/25 0637   BP: 129/75   Pulse: 64   Resp: 16   Temp: (!) 97.4 °F (36.3 °C)   SpO2: 95%

## 2025-05-19 NOTE — ANESTHESIA PROCEDURE NOTES
Peripheral Block    Patient location during procedure: holding area  Start time: 5/19/2025 7:50 AM  Reason for block: at surgeon's request and post-op pain management  Staffing  Performed by: Tunde Smith MD  Authorized by: Tunde Smith MD    Preanesthetic Checklist  Completed: patient identified, IV checked, site marked, risks and benefits discussed, surgical consent, monitors and equipment checked, pre-op evaluation and timeout performed  Peripheral Block  Patient position: supine  Prep: ChloraPrep  Patient monitoring: frequent blood pressure checks, continuous pulse oximetry and heart rate  Block type: Axillary (+ intercostobrachial)  Laterality: left  Injection technique: single-shot  Procedures: ultrasound guided, Ultrasound guidance required for the procedure to increase accuracy and safety of medication placement and decrease risk of complications.  Ultrasound permanent image saved  bupivacaine (PF) (MARCAINE) 0.5 % injection 20 mL - Perineural   20 mL - 5/19/2025 7:50:00 AM  bupivacaine liposomal (EXPAREL) 1.3 % injection 20 mL - Perineural   20 mL - 5/19/2025 7:50:00 AM  Needle  Needle type: Stimuplex   Needle gauge: 22 G  Needle length: 2 in  Needle localization: anatomical landmarks and ultrasound guidance  Assessment  Injection assessment: incremental injection, frequent aspiration, injected with ease, negative aspiration, negative for heart rate change, no paresthesia on injection, no symptoms of intraneural/intravenous injection and needle tip visualized at all times  Paresthesia pain: none  Post-procedure:  site cleaned  patient tolerated the procedure well with no immediate complications  Additional Notes  35 cc axillary, 5 cc intercostobrachial

## 2025-05-19 NOTE — ANESTHESIA POSTPROCEDURE EVALUATION
Post-Op Assessment Note    CV Status:  Stable    Pain management: adequate       Mental Status:  Alert and awake   Hydration Status:  Euvolemic   PONV Controlled:  Controlled   Airway Patency:  Patent     Post Op Vitals Reviewed: Yes    No anethesia notable event occurred.    Staff: Anesthesiologist           Last Filed PACU Vitals:  Vitals Value Taken Time   Temp 97.6 °F (36.4 °C) 05/19/25 10:15   Pulse 63 05/19/25 10:15   /74 05/19/25 10:15   Resp 16 05/19/25 10:15   SpO2 96 % 05/19/25 10:15       Modified Aston:     Vitals Value Taken Time   Activity 2 05/19/25 10:15   Respiration 2 05/19/25 10:15   Circulation 2 05/19/25 10:15   Consciousness 2 05/19/25 10:15   Oxygen Saturation 2 05/19/25 10:15     Modified Aston Score: 10

## 2025-05-19 NOTE — OP NOTE
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  He has failed appropriate nonoperative management including corticosteroid injections were provided good and temporary relief.  He would like to proceed now with surgical intervention due to symptoms which are affecting his quality  of life.    Operative Findings:  Thickened A1 pulley to Index and middle finger  MF MP joint: significant inflamed synovitis, full thickness cartilage wear 30% of the proximal phalanx base.  Grade 2 and 3 cartilage changes on the metacarpal head, no areas of full-thickness wear visible.    IF MP joint: Moderate inflamed synovitis, normal appearing cartilage on the proximal phalanx base and metacarpal head.       Complications:   None    Procedure and Technique:  The patient was greeted in the preoperative area. The risks, benefits, and alternatives of the procedure were again discussed in detail with the patient. The patient understood the risks and was amenable to proceed. The operative extremity was marked. Peripheral nerve block was placed by anesthesia. Patient was brought to the operating room and placed under anesthesia. A tourniquet was applied. The operative extremity was prepped and draped in the usual sterile fashion. A timeout was performed identifying the name and laterality of the procedure.     The arm was placed in the arthroscopy tower using large finger traps and strapped at the forearm and upper arm.      The limb was exsanguinated and the tourniquet was inflated.     Was started with the middle finger metacarpophalangeal joint arthroscopy. 8 pounds of traction were applied to the tower.  The joint was localized using an 18-gauge needle and 1 cc of saline was injected into the metacarpophalangeal joint.  A 15 blade scalpel was used to incise the skin ulnar to the EDC tendon, and hemostat was used to fully dissect through the sagittal band and into the joint.  The trocar was used to place our cannula for a 1.9 mm scope and then the scope was inserted.  We performed our diagnostic arthroscopy for send there is significant inflamed synovitis surrounding the entirety of the joint.  There is full-thickness cartilage wear on the base of the proximal phalanx covering approximately 30% of the joint  surface.  The metacarpal head had grade 2 and 3 changes no obvious areas of full-thickness wear.  We then inserted an 18g needle from the radial side of the extensor tendon to localize our incision for the shaver.  We then used the same nick and spread technique to enter the joint from the radial side and the 2.0 mm shaver was inserted.  We then performed a complete arthroscopic synovectomy.  We debrided loose areas of cartilage from the articular surface as well.  We then switched portals and completed our synovectomy on the ulnar gutter of the metacarpal head. When satisfied with our debridement the scope was removed.    We then proceeded with the index finger metacarpophalangeal joint arthroscopy.  Again, the joint was localized using an 18-gauge needle and 1 cc of saline was injected into the metacarpophalangeal joint.  A 15 blade scalpel was used to incise the skin ulnar to the EDC tendon, and hemostat was used to fully dissect through the sagittal band and into the joint.  The trocar was used to place our cannula for a 1.9 mm scope and then the scope was inserted.  We performed our diagnostic arthroscopy for send there is a right inflamed synovitis surrounding the entirety of the joint.  The joint surface was normal-appearing with no obvious cartilage wear.  We then inserted an 18g needle from the radial side of the extensor tendon to localize our incision for the shaver.  We then used the same nick and spread technique to enter the joint from the radial side and the 2.0 mm shaver was inserted.  We then performed a complete arthroscopic synovectomy.   We then switched portals and completed our synovectomy in the ulnar gutter next to the metacarpal head.  When satisfied with our debridement the scope was removed.    The arthroscopy tower was then removed from the table and we proceeded with the trigger releases.  A 2 cm oblique incision was made parallel to the distal palmar flexion crease centered over the A1  pulley of the index finger. Careful dissection down to the A1 pulley was performed, protecting the neurovascular bundles. The A1 pulley was incised with a knife and then this incision was extended proximally and distally with Yun scissors. The tendons were retracted out of the wound and inspected. They were found to be intact with no triggering during passive range of motion.     We then moved to the middle finger. A 2 cm oblique incision was made parallel to the distal palmar flexion crease centered over the A1 pulley of the middle finger. Careful dissection down to the A1 pulley was performed, protecting the neurovascular bundles. The A1 pulley was incised with a knife and then this incision was extended proximally and distally with Yun scissors. The tendons were retracted out of the wound and inspected. They were found to be intact with no triggering during passive range of motion.    The tourniquet was released and hemostasis achieved. The wound was closed in an interrupted fashion. Sterile dressings were applied. The patient was awoken and transported to the recovery room in stable condition.     Post operatively he should maintain his splint and nonweightbearing.  He can move his PIP joints as tolerated.. Follow up in 2 weeks as previously scheduled.      I was present for the entire procedure.    Patient Disposition:  PACU          SIGNATURE: Luis Alberto Lee MD  DATE: May 19, 2025  TIME: 9:46 AM

## 2025-05-20 ENCOUNTER — TELEPHONE (OUTPATIENT)
Age: 50
End: 2025-05-20

## 2025-05-30 ENCOUNTER — OFFICE VISIT (OUTPATIENT)
Dept: OBGYN CLINIC | Facility: CLINIC | Age: 50
End: 2025-05-30

## 2025-05-30 VITALS — WEIGHT: 214 LBS | BODY MASS INDEX: 30.64 KG/M2 | HEIGHT: 70 IN

## 2025-05-30 DIAGNOSIS — Z98.890 S/P MUSCULOSKELETAL SYSTEM SURGERY: Primary | ICD-10-CM

## 2025-05-30 DIAGNOSIS — M19.042 DEGENERATIVE ARTHRITIS OF METACARPOPHALANGEAL JOINT OF MIDDLE FINGER OF LEFT HAND: ICD-10-CM

## 2025-05-30 DIAGNOSIS — M19.042 DEGENERATIVE ARTHRITIS OF METACARPOPHALANGEAL JOINT OF INDEX FINGER OF LEFT HAND: ICD-10-CM

## 2025-05-30 PROCEDURE — 99024 POSTOP FOLLOW-UP VISIT: CPT | Performed by: ORTHOPAEDIC SURGERY

## 2025-05-30 NOTE — LETTER
May 30, 2025     Patient: Ruben Nicholson  YOB: 1975  Date of Visit: 5/30/2025      To Whom it May Concern:    Ruben Nicholson is under my professional care. Ruben was seen in my office on 5/30/2025. He should remain out of work until 6/30/25. He will be re-evaluated at his next visit.     If you have any questions or concerns, please don't hesitate to call.          Sincerely,          Luis Alberto Lee MD        CC: No Recipients

## 2025-05-30 NOTE — PROGRESS NOTES
HAND & UPPER EXTREMITY OFFICE VISIT   Referred By:  No referring provider defined for this encounter.      Chief Complaint:       No complaint(s), Post-operative visit following surgery    Surgery:  Surgery Date: 5/19/2025 - RELEASE TRIGGER FINGER - Left index and middle finger,  Left index and middle finger metacarpophalangeal joint arthroscopic - Left     History of Present Illness:   Patient presents now 11 days status post the above surgery. he reports that he is healing well. He states that there has been no incisional tenderness, redness/drainage from the incision sites, and that pain has been minimal. The patient states that he is experiencing slight stiffness of the left hand due to lack of mobility. Hand exercises to increase post-operative mobility and decrease stiffness were reviewed and home exercise information was provided. Patient was counseled on weight-bearing status and proper recovery activity levels.      Past Medical History:  Past Medical History[1]  Past Surgical History[2]  Family History[3]  Social History     Socioeconomic History    Marital status: /Civil Union     Spouse name: Not on file    Number of children: Not on file    Years of education: Not on file    Highest education level: Not on file   Occupational History    Not on file   Tobacco Use    Smoking status: Never    Smokeless tobacco: Never   Vaping Use    Vaping status: Never Used   Substance and Sexual Activity    Alcohol use: Never    Drug use: Never    Sexual activity: Not on file   Other Topics Concern    Not on file   Social History Narrative    Not on file     Social Drivers of Health     Financial Resource Strain: Not on file   Food Insecurity: Not on file   Transportation Needs: Not on file   Physical Activity: Not on file   Stress: Not on file   Social Connections: Not on file   Intimate Partner Violence: Not on file   Housing Stability: Not on file     Scheduled Meds:  Continuous Infusions:No current  "facility-administered medications for this visit.    PRN Meds:.  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 male presents 11 days status post RELEASE TRIGGER FINGER - Left index and middle finger, and  Left index and middle finger metacarpophalangeal joint arthroscopic synovectomy.    He is doing well.  His pain is well-controlled and better than it was initially.  Will start to work on active range of motion.  He would like to start with a home exercise program for now.  He will call back if he wishes to proceed with PT.    Follow-up in 4 weeks, work note given              It is recommended he return to the office in 1 month, or sooner should symptoms worsen    he expressed understanding of the plan and agreed. We encouraged them to contact our office with any questions or concerns.         Luis Alberto Lee MD  Hand and Upper Extremity Surgery          *This note was dictated using Dragon voice recognition software. Please excuse any word substitutions or errors.*      Scribe Attestation      I,:   am acting as a scribe while in the presence of the attending physician.:       I,:   personally performed the services described in this documentation    as scribed in my presence.:                     [1] "   Past Medical History:  Diagnosis Date    Disease of thyroid gland     [place]    PAF (paroxysmal atrial fibrillation) (HCC)    [2]   Past Surgical History:  Procedure Laterality Date    BACK SURGERY      Lumbar    [place] ARTHRS METACARPOPHALANGEAL JOINT DEBRIDEMENT Left 5/19/2025    Procedure: RELEASE TRIGGER FINGER - Left index and middle finger,  Left index and middle finger metacarpophalangeal joint arthroscopic;  Surgeon: Luis Alberto Lee MD;  Location: [facility];  Service: Orthopedics    [place] TENDON SHEATH INCISION Left 5/19/2025    Procedure: RELEASE TRIGGER FINGER - Left index and middle finger,  Left index and middle finger metacarpophalangeal joint arthroscopic;  Surgeon: Luis Alberto Lee MD;  Location: [facility];  Service: Orthopedics   [3] No family history on file.  [4] No Known Allergies

## 2025-05-30 NOTE — ASSESSMENT & PLAN NOTE
50 y.o. male presents 11 days status post RELEASE TRIGGER FINGER - Left index and middle finger, and  Left index and middle finger metacarpophalangeal joint arthroscopic synovectomy.    He is doing well.  His pain is well-controlled and better than it was initially.  Will start to work on active range of motion.  He would like to start with a home exercise program for now.  He will call back if he wishes to proceed with PT.    Follow-up in 4 weeks, work note given

## 2025-06-27 ENCOUNTER — OFFICE VISIT (OUTPATIENT)
Dept: OBGYN CLINIC | Facility: CLINIC | Age: 50
End: 2025-06-27

## 2025-06-27 DIAGNOSIS — M19.042 DEGENERATIVE ARTHRITIS OF METACARPOPHALANGEAL JOINT OF MIDDLE FINGER OF LEFT HAND: ICD-10-CM

## 2025-06-27 DIAGNOSIS — M19.042 DEGENERATIVE ARTHRITIS OF METACARPOPHALANGEAL JOINT OF INDEX FINGER OF LEFT HAND: ICD-10-CM

## 2025-06-27 DIAGNOSIS — Z98.890 S/P MUSCULOSKELETAL SYSTEM SURGERY: Primary | ICD-10-CM

## 2025-06-27 PROCEDURE — 99024 POSTOP FOLLOW-UP VISIT: CPT | Performed by: ORTHOPAEDIC SURGERY

## 2025-06-27 NOTE — Clinical Note
June 27, 2025     Patient: Ruben Nicholson  YOB: 1975  Date of Visit: 6/27/2025      To Whom it May Concern:    Ruben Nicholson is under my professional care. Ruben was seen in my office on 6/27/2025. Ruben {Return to school/sport/work:[id number]}.    If you have any questions or concerns, please don't hesitate to call.         Sincerely,          Luis Alberto Lee MD        CC: No Recipients

## 2025-06-27 NOTE — Clinical Note
June 27, 2025     Patient: Ruben Nicholson  YOB: 1975  Date of Visit: 6/27/2025      To Whom it May Concern:    Ruben Nicholson is under my professional care. Ruben was seen in my office on 6/27/2025. Ruben {Return to school/sport/work:[id number]}.    If you have any questions or concerns, please don't hesitate to call.         Sincerely,          Luis Alberto Lee MD        CC: No Recipients

## 2025-06-27 NOTE — LETTER
June 27, 2025     Patient: Ruben Nicholson  YOB: 1975  Date of Visit: 6/27/2025      To Whom it May Concern:    Ruben Nicholson is under my professional care. Ruben was seen in my office on 6/27/2025. Ruben may return to work on 7/7/2025.    If you have any questions or concerns, please don't hesitate to call.         Sincerely,          Luis Alberto Lee MD        CC: No Recipients

## 2025-06-27 NOTE — Clinical Note
June 27, 2025     Patient: Ruben Nicholson  YOB: 1975  Date of Visit: 6/27/2025      To Whom it May Concern:    Ruben Nicholson is under my professional care. Ruben was seen in my office on 6/27/2025.    If you have any questions or concerns, please don't hesitate to call.          Sincerely,          Luis Alberto Lee MD        CC: No Recipients

## 2025-06-27 NOTE — PROGRESS NOTES
HAND & UPPER EXTREMITY OFFICE VISIT   Referred By:  No referring provider defined for this encounter.      Chief Complaint:     Postop    Surgery:  Surgery Date: 5/19/2025 - RELEASE TRIGGER FINGER - Left index and middle finger,  Left index and middle finger metacarpophalangeal joint arthroscopic - Left     History of Present Illness:   Patient presents now 39 days status post the above surgery. he reports that he is overall doing well at this time and denies any significant pain or discomfort.  He states that the stiffness in his hand is gradually improving.  He denies any new injury or trauma to his left upper extremity.  He denies any associated numbness or tingling.  He states that he has continued to be compliant with his home exercise program as he has not attended any outpatient physical therapy.      Past Medical History:  Past Medical History[1]  Past Surgical History[2]  Family History[3]  Social History     Socioeconomic History    Marital status: /Civil Union     Spouse name: Not on file    Number of children: Not on file    Years of education: Not on file    Highest education level: Not on file   Occupational History    Not on file   Tobacco Use    Smoking status: Never    Smokeless tobacco: Never   Vaping Use    Vaping status: Never Used   Substance and Sexual Activity    Alcohol use: Never    Drug use: Never    Sexual activity: Not on file   Other Topics Concern    Not on file   Social History Narrative    Not on file     Social Drivers of Health     Financial Resource Strain: Not on file   Food Insecurity: Not on file   Transportation Needs: Not on file   Physical Activity: Not on file   Stress: Not on file   Social Connections: Not on file   Intimate Partner Violence: Not on file   Housing Stability: Not on file     Scheduled Meds:  Continuous Infusions:No current facility-administered medications for this visit.    PRN Meds:.  Allergies[4]    Physical Examination:    There were no vitals  taken for this visit.    Gen: A&Ox3, NAD    Left Upper Extremity:  Incisions appear healed.  Sutures and dressing previously removed.  No surrounding erythema or edema  Able to make near full composite fist. Able to full extend fingers.   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 male presents 39 days status post RELEASE TRIGGER FINGER - Left index and middle finger,  Left index and middle finger metacarpophalangeal joint arthroscopic - Left.     He is overall progressing well. He has no siginficant pain and is much improved compared to pre op. Discussed with him that he can advance his activity as tolerated. Recommended that he should continue his home exercises to help improve his motion, however I discussed with him that some of his stiffness may be secondary to his degenerative changes. I did review exercises him again at today's visit to help with motion and he will reach out if he wishes to attend formal outpatient PT. I did provide him with an updated work note at today's visit.     It is recommended he return to the office in 2 months, or sooner should symptoms worsen    he expressed understanding of the plan and agreed. We encouraged them to contact our office with any questions or concerns.         Luis Alberto Lee MD  Hand and Upper Extremity Surgery          *This note was dictated using Dragon voice recognition software. Please excuse any word substitutions or errors.*      Scribe  Attestation      I,:  Jeovanny Llanos am acting as a scribe while in the presence of the attending physician.:       I,:  Luis Alberto Lee MD personally performed the services described in this documentation    as scribed in my presence.:                    [1]   Past Medical History:  Diagnosis Date    Disease of thyroid gland     [place]    PAF (paroxysmal atrial fibrillation) (HCC)    [2]   Past Surgical History:  Procedure Laterality Date    BACK SURGERY      Lumbar    [place] ARTHRS METACARPOPHALANGEAL JOINT DEBRIDEMENT Left 5/19/2025    Procedure: RELEASE TRIGGER FINGER - Left index and middle finger,  Left index and middle finger metacarpophalangeal joint arthroscopic;  Surgeon: Luis Alberto Lee MD;  [facility];  Service: Orthopedics    [place] TENDON SHEATH INCISION Left 5/19/2025    Procedure: RELEASE TRIGGER FINGER - Left index and middle finger,  Left index and middle finger metacarpophalangeal joint arthroscopic;  Surgeon: Luis Alberto Lee MD;  Location: [facility];  Service: Orthopedics   [3] No family history on file.  [4] No Known Allergies

## 2025-08-08 ENCOUNTER — APPOINTMENT (OUTPATIENT)
Dept: RADIOLOGY | Facility: CLINIC | Age: 50
End: 2025-08-08
Attending: STUDENT IN AN ORGANIZED HEALTH CARE EDUCATION/TRAINING PROGRAM
Payer: COMMERCIAL

## 2025-08-08 ENCOUNTER — OFFICE VISIT (OUTPATIENT)
Dept: OBGYN CLINIC | Facility: CLINIC | Age: 50
End: 2025-08-08
Payer: COMMERCIAL

## 2025-08-08 VITALS — BODY MASS INDEX: 30.21 KG/M2 | WEIGHT: 211 LBS | HEIGHT: 70 IN

## 2025-08-08 DIAGNOSIS — M54.12 CERVICAL RADICULOPATHY: ICD-10-CM

## 2025-08-08 DIAGNOSIS — M25.311 DYSKINESIS OF RIGHT SCAPULA: Primary | ICD-10-CM

## 2025-08-08 PROCEDURE — 99214 OFFICE O/P EST MOD 30 MIN: CPT | Performed by: STUDENT IN AN ORGANIZED HEALTH CARE EDUCATION/TRAINING PROGRAM

## (undated) DEVICE — ARM SLING: Brand: DEROYAL

## (undated) DEVICE — OCCLUSIVE GAUZE STRIP,3% BISMUTH TRIBROMOPHENATE IN PETROLATUM BLEND: Brand: XEROFORM

## (undated) DEVICE — PREMIUM DRY TRAY LF: Brand: MEDLINE INDUSTRIES, INC.

## (undated) DEVICE — KNIFE LIGHT 10,PK: Brand: KNIFELIGHT

## (undated) DEVICE — GLOVE PI ULTRA TOUCH SZ.8.0

## (undated) DEVICE — CUFF TOURNIQUET 18 X 4 IN QUICK CONNECT DISP 1 BLADDER

## (undated) DEVICE — CHLORHEXIDINE 4PCT 4 OZ

## (undated) DEVICE — CAST PLASTER 4 IN ROLL

## (undated) DEVICE — INTENDED FOR TISSUE SEPARATION, AND OTHER PROCEDURES THAT REQUIRE A SHARP SURGICAL BLADE TO PUNCTURE OR CUT.: Brand: BARD-PARKER ® CARBON RIB-BACK BLADES

## (undated) DEVICE — PAD CAST 4 IN COTTON NON STERILE

## (undated) DEVICE — GLOVE SRG BIOGEL 6.5

## (undated) DEVICE — PREP PAD BNS: Brand: CONVERTORS

## (undated) DEVICE — GAUZE SPONGES,16 PLY: Brand: CURITY

## (undated) DEVICE — STERILE BETHLEHEM PLASTIC HAND: Brand: CARDINAL HEALTH

## (undated) DEVICE — ACE WRAP 4 IN UNSTERILE

## (undated) DEVICE — GLOVE INDICATOR PI UNDERGLOVE SZ 6.5 BLUE

## (undated) DEVICE — SUT ETHILON 4-0 PS-2 18 IN 1667H

## (undated) DEVICE — NEEDLE 25G X 1 1/2

## (undated) DEVICE — GLOVE INDICATOR PI UNDERGLOVE SZ 8 BLUE

## (undated) DEVICE — TUBING ARTHROSCOPIC WAVE  MAIN PUMP

## (undated) DEVICE — CAST PADDING 4 IN UNSTERILE